# Patient Record
Sex: FEMALE | Race: OTHER | HISPANIC OR LATINO
[De-identification: names, ages, dates, MRNs, and addresses within clinical notes are randomized per-mention and may not be internally consistent; named-entity substitution may affect disease eponyms.]

---

## 2023-10-03 PROBLEM — Z00.00 ENCOUNTER FOR PREVENTIVE HEALTH EXAMINATION: Status: ACTIVE | Noted: 2023-10-03

## 2023-10-04 ENCOUNTER — APPOINTMENT (OUTPATIENT)
Dept: HEART AND VASCULAR | Facility: CLINIC | Age: 58
End: 2023-10-04
Payer: COMMERCIAL

## 2023-10-04 VITALS
DIASTOLIC BLOOD PRESSURE: 65 MMHG | TEMPERATURE: 97.2 F | HEART RATE: 80 BPM | BODY MASS INDEX: 28.7 KG/M2 | WEIGHT: 124 LBS | HEIGHT: 55 IN | SYSTOLIC BLOOD PRESSURE: 122 MMHG

## 2023-10-04 DIAGNOSIS — I05.0 RHEUMATIC MITRAL STENOSIS: ICD-10-CM

## 2023-10-04 DIAGNOSIS — T82.7XXA INFECTION AND INFLAMMATORY REACTION DUE TO OTHER CARDIAC AND VASCULAR DEVICES, IMPLANTS AND GRAFTS, INITIAL ENCOUNTER: ICD-10-CM

## 2023-10-04 PROCEDURE — 93000 ELECTROCARDIOGRAM COMPLETE: CPT

## 2023-10-04 PROCEDURE — 99205 OFFICE O/P NEW HI 60 MIN: CPT | Mod: 25

## 2023-10-04 RX ORDER — WARFARIN 4 MG/1
4 TABLET ORAL
Refills: 0 | Status: ACTIVE | COMMUNITY
Start: 2023-10-04

## 2023-10-16 ENCOUNTER — INPATIENT (INPATIENT)
Facility: HOSPITAL | Age: 58
LOS: 0 days | Discharge: HOME | DRG: 274 | End: 2023-10-17
Attending: INTERNAL MEDICINE | Admitting: INTERNAL MEDICINE
Payer: COMMERCIAL

## 2023-10-16 VITALS
SYSTOLIC BLOOD PRESSURE: 120 MMHG | RESPIRATION RATE: 17 BRPM | DIASTOLIC BLOOD PRESSURE: 70 MMHG | HEART RATE: 78 BPM | OXYGEN SATURATION: 98 %

## 2023-10-16 DIAGNOSIS — I48.92 UNSPECIFIED ATRIAL FLUTTER: ICD-10-CM

## 2023-10-16 DIAGNOSIS — Z95.2 PRESENCE OF PROSTHETIC HEART VALVE: Chronic | ICD-10-CM

## 2023-10-16 LAB
ANION GAP SERPL CALC-SCNC: 11 MMOL/L — SIGNIFICANT CHANGE UP (ref 5–17)
APTT BLD: 44.1 SEC — HIGH (ref 24.5–35.6)
BLD GP AB SCN SERPL QL: NEGATIVE — SIGNIFICANT CHANGE UP
BUN SERPL-MCNC: 13 MG/DL — SIGNIFICANT CHANGE UP (ref 7–23)
CALCIUM SERPL-MCNC: 9.6 MG/DL — SIGNIFICANT CHANGE UP (ref 8.4–10.5)
CHLORIDE SERPL-SCNC: 101 MMOL/L — SIGNIFICANT CHANGE UP (ref 96–108)
CO2 SERPL-SCNC: 28 MMOL/L — SIGNIFICANT CHANGE UP (ref 22–31)
CREAT SERPL-MCNC: 0.73 MG/DL — SIGNIFICANT CHANGE UP (ref 0.5–1.3)
EGFR: 95 ML/MIN/1.73M2 — SIGNIFICANT CHANGE UP
GLUCOSE SERPL-MCNC: 109 MG/DL — HIGH (ref 70–99)
HCT VFR BLD CALC: 43.8 % — SIGNIFICANT CHANGE UP (ref 34.5–45)
HGB BLD-MCNC: 14 G/DL — SIGNIFICANT CHANGE UP (ref 11.5–15.5)
INR BLD: 2.15 — HIGH (ref 0.85–1.18)
MCHC RBC-ENTMCNC: 28.5 PG — SIGNIFICANT CHANGE UP (ref 27–34)
MCHC RBC-ENTMCNC: 32 GM/DL — SIGNIFICANT CHANGE UP (ref 32–36)
MCV RBC AUTO: 89.2 FL — SIGNIFICANT CHANGE UP (ref 80–100)
NRBC # BLD: 0 /100 WBCS — SIGNIFICANT CHANGE UP (ref 0–0)
PLATELET # BLD AUTO: 291 K/UL — SIGNIFICANT CHANGE UP (ref 150–400)
POTASSIUM SERPL-MCNC: 4.2 MMOL/L — SIGNIFICANT CHANGE UP (ref 3.5–5.3)
POTASSIUM SERPL-SCNC: 4.2 MMOL/L — SIGNIFICANT CHANGE UP (ref 3.5–5.3)
PROTHROM AB SERPL-ACNC: 24 SEC — HIGH (ref 9.5–13)
RBC # BLD: 4.91 M/UL — SIGNIFICANT CHANGE UP (ref 3.8–5.2)
RBC # FLD: 13.3 % — SIGNIFICANT CHANGE UP (ref 10.3–14.5)
RH IG SCN BLD-IMP: POSITIVE — SIGNIFICANT CHANGE UP
SODIUM SERPL-SCNC: 140 MMOL/L — SIGNIFICANT CHANGE UP (ref 135–145)
WBC # BLD: 9.07 K/UL — SIGNIFICANT CHANGE UP (ref 3.8–10.5)
WBC # FLD AUTO: 9.07 K/UL — SIGNIFICANT CHANGE UP (ref 3.8–10.5)

## 2023-10-16 PROCEDURE — 93656 COMPRE EP EVAL ABLTJ ATR FIB: CPT

## 2023-10-16 PROCEDURE — 99223 1ST HOSP IP/OBS HIGH 75: CPT | Mod: 25

## 2023-10-16 PROCEDURE — 93657 TX L/R ATRIAL FIB ADDL: CPT

## 2023-10-16 PROCEDURE — 93655 ICAR CATH ABLTJ DSCRT ARRHYT: CPT

## 2023-10-16 RX ORDER — METOPROLOL TARTRATE 50 MG
12.5 TABLET ORAL
Refills: 0 | Status: DISCONTINUED | OUTPATIENT
Start: 2023-10-16 | End: 2023-10-17

## 2023-10-16 RX ORDER — WARFARIN SODIUM 2.5 MG/1
5 TABLET ORAL ONCE
Refills: 0 | Status: COMPLETED | OUTPATIENT
Start: 2023-10-16 | End: 2023-10-16

## 2023-10-16 RX ORDER — ATORVASTATIN CALCIUM 80 MG/1
40 TABLET, FILM COATED ORAL AT BEDTIME
Refills: 0 | Status: DISCONTINUED | OUTPATIENT
Start: 2023-10-16 | End: 2023-10-17

## 2023-10-16 RX ORDER — ACETAMINOPHEN 500 MG
650 TABLET ORAL EVERY 6 HOURS
Refills: 0 | Status: DISCONTINUED | OUTPATIENT
Start: 2023-10-16 | End: 2023-10-17

## 2023-10-16 RX ORDER — FUROSEMIDE 40 MG
20 TABLET ORAL ONCE
Refills: 0 | Status: DISCONTINUED | OUTPATIENT
Start: 2023-10-16 | End: 2023-10-17

## 2023-10-16 RX ADMIN — Medication 12.5 MILLIGRAM(S): at 22:03

## 2023-10-16 RX ADMIN — WARFARIN SODIUM 5 MILLIGRAM(S): 2.5 TABLET ORAL at 22:03

## 2023-10-16 RX ADMIN — ATORVASTATIN CALCIUM 40 MILLIGRAM(S): 80 TABLET, FILM COATED ORAL at 22:03

## 2023-10-16 NOTE — PATIENT PROFILE ADULT - PATIENT'S PREFERRED PRONOUN
Please return with fevers, increased work of breathing, or signs of dehydration.    For cardiac concerns, please contact the team at: [phone]   Her/She

## 2023-10-16 NOTE — PATIENT PROFILE ADULT - FALL HARM RISK - HARM RISK INTERVENTIONS

## 2023-10-16 NOTE — CHART NOTE - NSCHARTNOTEFT_GEN_A_CORE
EPS BRIEF OP NOTE    LISBET NEAL  [id number]    PROCEDURE:  - Atrial flutter ablation  - Atrial fibrillation ablation    INDICATION:  - Paroxysmal AF  - Atrial flutter    ELECTROPHYSIOLOGIST(S):  - Dr. Archuleta (Attending)  - Dr. Armijo (Fellow)    ANESTHESIOLOGY:  - General  - Local    FINDINGS:  - Patient arrived to the lab in flutter  - US guided access of the bilateral femoral veins. 8Fr in RFV; 9 + 7Fr in LFV  - RA Activation map with Octaray demonstrated typical atrial flutter  - Successful termination of TAFL to sinus rhythm with ablation in CTI   - Transseptal access obtained via ACQCross and SL1  - Voltage map of LA demonstrated intact conduction in the area of the left og & ridge, atrial floor, and lateral mitral isthmus  - Successful isolation of the left pulmonary veins via ablation at the Coumadin ridge and around the inferior aspect of the LIPV. Successful block across the lateral mitral isthmus with ablation at the isthmus. Successful isolation of the LA floor with ablation at the LA floor.  - No effusion at the end of the procedure  - Protamine 50mg given to reverse anticoagulation  - Vascade MVP used for hemostasis at all access sites    COMPLICATIONS:  - None    RECOMMENDATIONS:  - 20mg IV lasix once awake  - 3 hours bedrest  - Continue uninterrupted anticoagulation  - Continue home meds  - Dispo: Admit for overnight monitoring    Please refer to the full report to follow in [facility] for a detailed description of this case.    --  Paco Armijo MD  Electrophysiology PGY8

## 2023-10-16 NOTE — H&P ADULT - HISTORY OF PRESENT ILLNESS
Kelsi Hsieh is a 58 year old woman with a history of paroxysmal atrial fibrillation (Coumadin) s/p MAZE/DIANA closure (3/2018 [facility]), rheumatic heart disease w/mitral stenosis s/p bioprosthetic MVR (3/2018 [facility]), PPM (4/2018 [facility]) c/b infection and subsequent extraction (8/2018 [facility]), HTN, HLD who presents for elective ablation.     She was diagnosed with atrial fibrillation many years ago and has been maintained on coumadin for stroke prevention. At one point she was on amiodarone. She states that was discontinued after her surgery in 2018 where she underwent a MVR along with MAZE and DIANA clipping. She also had a pacemaker implanted for what sounds like symptomatic bradycardia, which was complicated by device infection requiring extraction four months post-implant. She did well from an arrhythmia perspective until last month when she had a sudden recurrence of arrhythmia with associated symptoms of extreme fatigue, palpitations, heart racing and muscle weakness. She was recently admitted 9/8/23 - 9/11/23 for AF w/RVR to an outside hospital. She was seen for cardiology f/u on 9/19 and had her metoprolol increased for better rate control.  ?  She denies SOB, chest pain, dizziness, syncope. No ER / hospital visits since she was seen in the office             ECG: 10/4/23: atrial flutter, RBBB    Echo: TTE 9/2023: LVEF 50%. Severe TR. Bioprosthetic MV w/o significant stenosis  MARTA 5/14/19: LVEF 50-55%, mobile echo density measuring 1.1 cm x 0.6 cm which is attached to the tip of chordae with indepedent mobility most likely a residual part of a papillary muscle post surgery. The LA and RA are very dilated. The RV is dilated. Moderate TR. Mild atherosclerosis of the descending aorta and the aortic arch is seen.    CT/MRI: CCTA 4/27/23 - normal coronaries w/right dominant circulation, CaSc = 0. Moderate enlargement of main PA  ?

## 2023-10-16 NOTE — H&P ADULT - ASSESSMENT
Kelsi Hsieh is a 58 year old woman with a history of paroxysmal atrial fibrillation (Coumadin) s/p MAZE/DIANA closure (3/2018 [place]), rheumatic heart disease w/mitral stenosis s/p bioprosthetic MVR (3/2018 [place]), PPM (4/2018 [place]) c/b infection and subsequent extraction (8/2018 [place]), HTN, HLD who presents for elective ablation.

## 2023-10-17 ENCOUNTER — TRANSCRIPTION ENCOUNTER (OUTPATIENT)
Age: 58
End: 2023-10-17

## 2023-10-17 VITALS
DIASTOLIC BLOOD PRESSURE: 60 MMHG | RESPIRATION RATE: 18 BRPM | SYSTOLIC BLOOD PRESSURE: 128 MMHG | HEART RATE: 60 BPM | OXYGEN SATURATION: 98 %

## 2023-10-17 LAB
HCV AB S/CO SERPL IA: 0.04 S/CO — SIGNIFICANT CHANGE UP
HCV AB S/CO SERPL IA: 0.04 S/CO — SIGNIFICANT CHANGE UP
HCV AB SERPL-IMP: SIGNIFICANT CHANGE UP
HCV AB SERPL-IMP: SIGNIFICANT CHANGE UP
INR BLD: 2.17 — HIGH (ref 0.85–1.18)
INR BLD: 2.17 — HIGH (ref 0.85–1.18)
ISTAT INR: 1.9 — HIGH (ref 0.88–1.16)
ISTAT INR: 1.9 — HIGH (ref 0.88–1.16)
ISTAT PT: 21.9 SEC — HIGH (ref 10–12.9)
ISTAT PT: 21.9 SEC — HIGH (ref 10–12.9)
ISTAT VENOUS BE: 2 MMOL/L — SIGNIFICANT CHANGE UP (ref -2–3)
ISTAT VENOUS BE: 2 MMOL/L — SIGNIFICANT CHANGE UP (ref -2–3)
ISTAT VENOUS GLUCOSE: 105 MG/DL — HIGH (ref 70–99)
ISTAT VENOUS GLUCOSE: 105 MG/DL — HIGH (ref 70–99)
ISTAT VENOUS HCO3: 28 MMOL/L — SIGNIFICANT CHANGE UP (ref 23–28)
ISTAT VENOUS HCO3: 28 MMOL/L — SIGNIFICANT CHANGE UP (ref 23–28)
ISTAT VENOUS HEMATOCRIT: 44 % — SIGNIFICANT CHANGE UP (ref 34.5–45)
ISTAT VENOUS HEMATOCRIT: 44 % — SIGNIFICANT CHANGE UP (ref 34.5–45)
ISTAT VENOUS HEMOGLOBIN: 15 GM/DL — SIGNIFICANT CHANGE UP (ref 11.5–15.5)
ISTAT VENOUS HEMOGLOBIN: 15 GM/DL — SIGNIFICANT CHANGE UP (ref 11.5–15.5)
ISTAT VENOUS IONIZED CALCIUM: 1.15 MMOL/L — SIGNIFICANT CHANGE UP (ref 1.12–1.3)
ISTAT VENOUS IONIZED CALCIUM: 1.15 MMOL/L — SIGNIFICANT CHANGE UP (ref 1.12–1.3)
ISTAT VENOUS PCO2: 45 MMHG — SIGNIFICANT CHANGE UP (ref 41–51)
ISTAT VENOUS PCO2: 45 MMHG — SIGNIFICANT CHANGE UP (ref 41–51)
ISTAT VENOUS PH: 7.39 — SIGNIFICANT CHANGE UP (ref 7.31–7.41)
ISTAT VENOUS PH: 7.39 — SIGNIFICANT CHANGE UP (ref 7.31–7.41)
ISTAT VENOUS PO2: <66 MMHG — LOW (ref 35–40)
ISTAT VENOUS PO2: <66 MMHG — LOW (ref 35–40)
ISTAT VENOUS POTASSIUM: 4.3 MMOL/L — SIGNIFICANT CHANGE UP (ref 3.5–5.3)
ISTAT VENOUS POTASSIUM: 4.3 MMOL/L — SIGNIFICANT CHANGE UP (ref 3.5–5.3)
ISTAT VENOUS SO2: 36 % — SIGNIFICANT CHANGE UP
ISTAT VENOUS SO2: 36 % — SIGNIFICANT CHANGE UP
ISTAT VENOUS SODIUM: 139 MMOL/L — SIGNIFICANT CHANGE UP (ref 135–145)
ISTAT VENOUS SODIUM: 139 MMOL/L — SIGNIFICANT CHANGE UP (ref 135–145)
ISTAT VENOUS TCO2: 29 MMOL/L — SIGNIFICANT CHANGE UP (ref 22–31)
ISTAT VENOUS TCO2: 29 MMOL/L — SIGNIFICANT CHANGE UP (ref 22–31)
PROTHROM AB SERPL-ACNC: 24.2 SEC — HIGH (ref 9.5–13)
PROTHROM AB SERPL-ACNC: 24.2 SEC — HIGH (ref 9.5–13)

## 2023-10-17 PROCEDURE — 82330 ASSAY OF CALCIUM: CPT

## 2023-10-17 PROCEDURE — 36415 COLL VENOUS BLD VENIPUNCTURE: CPT

## 2023-10-17 PROCEDURE — 86900 BLOOD TYPING SEROLOGIC ABO: CPT

## 2023-10-17 PROCEDURE — 86850 RBC ANTIBODY SCREEN: CPT

## 2023-10-17 PROCEDURE — 82803 BLOOD GASES ANY COMBINATION: CPT

## 2023-10-17 PROCEDURE — 85730 THROMBOPLASTIN TIME PARTIAL: CPT

## 2023-10-17 PROCEDURE — C1769: CPT

## 2023-10-17 PROCEDURE — 80048 BASIC METABOLIC PNL TOTAL CA: CPT

## 2023-10-17 PROCEDURE — 85610 PROTHROMBIN TIME: CPT

## 2023-10-17 PROCEDURE — 82947 ASSAY GLUCOSE BLOOD QUANT: CPT

## 2023-10-17 PROCEDURE — 85014 HEMATOCRIT: CPT

## 2023-10-17 PROCEDURE — C1894: CPT

## 2023-10-17 PROCEDURE — 99239 HOSP IP/OBS DSCHRG MGMT >30: CPT

## 2023-10-17 PROCEDURE — C1732: CPT

## 2023-10-17 PROCEDURE — 86901 BLOOD TYPING SEROLOGIC RH(D): CPT

## 2023-10-17 PROCEDURE — 84295 ASSAY OF SERUM SODIUM: CPT

## 2023-10-17 PROCEDURE — 84132 ASSAY OF SERUM POTASSIUM: CPT

## 2023-10-17 PROCEDURE — 85027 COMPLETE CBC AUTOMATED: CPT

## 2023-10-17 PROCEDURE — 86803 HEPATITIS C AB TEST: CPT

## 2023-10-17 PROCEDURE — C1760: CPT

## 2023-10-17 PROCEDURE — C1759: CPT

## 2023-10-17 PROCEDURE — C1893: CPT

## 2023-10-17 PROCEDURE — C1766: CPT

## 2023-10-17 RX ADMIN — Medication 12.5 MILLIGRAM(S): at 09:33

## 2023-10-17 NOTE — DISCHARGE NOTE PROVIDER - NSDCMRMEDTOKEN_GEN_ALL_CORE_FT
Lipitor 40 mg oral tablet: 1 tab(s) orally once a day (at bedtime)  metoprolol tartrate 25 mg oral tablet: 0.5 tab(s) orally 2 times a day  warfarin 4 mg oral tablet: 1 tab(s) orally once a day (at bedtime)

## 2023-10-17 NOTE — DISCHARGE NOTE PROVIDER - CARE PROVIDER_API CALL
Nilton Archuleta  Cardiac Electrophysiology  [address], [address] [place] [address]  Phone: [phone]  Fax: [phone]  Follow Up Time:

## 2023-10-17 NOTE — DISCHARGE NOTE NURSING/CASE MANAGEMENT/SOCIAL WORK - PATIENT PORTAL LINK FT
You can access the FollowMyHealth Patient Portal offered by [facility] by registering at the following website: [email or web address]. By joining [employer]’s FollowMyHealth portal, you will also be able to view your health information using other applications (apps) compatible with our system.

## 2023-10-17 NOTE — DISCHARGE NOTE PROVIDER - NSDCFUADDAPPT_GEN_ALL_CORE_FT
Follow up Dr. Archuleta 11/15/23 @ 9:45 am, call [phone] if you have questions.   Avoid heavy lifting, exercise and strenuous activity for 7 days  Showering is Ok.

## 2023-10-17 NOTE — DISCHARGE NOTE PROVIDER - NSDCCPGOAL_GEN_ALL_CORE_FT
To get better and follow your care plan as instructed. Rinvoq Pregnancy And Lactation Text: Based on animal studies, Rinvoq may cause embryo-fetal harm when administered to pregnant women.  The medication should not be used in pregnancy.  Breastfeeding is not recommended during treatment and for 6 days after the last dose.

## 2023-10-17 NOTE — DISCHARGE NOTE PROVIDER - NSDCCPTREATMENT_GEN_ALL_CORE_FT
PRINCIPAL PROCEDURE  Procedure: Ablation of arrhythmogenic focus for atrial flutter with anesthesia  Findings and Treatment: and atrial fibrillation

## 2023-10-17 NOTE — DISCHARGE NOTE PROVIDER - NSDCFUSCHEDAPPT_GEN_ALL_CORE_FT
Nilton Archuleta  [facility]  [facility] [address]  Scheduled Appointment: 11/15/2023

## 2023-10-17 NOTE — DISCHARGE NOTE PROVIDER - HOSPITAL COURSE
58 year old woman with a history of paroxysmal atrial fibrillation (Coumadin) s/p MAZE/DIANA closure (3/2018 [facility]), rheumatic heart disease w/mitral stenosis s/p bioprosthetic MVR (3/2018 [facility]), PPM (4/2018 [facility]) c/b infection and subsequent extraction (8/2018 [facility]), HTN, HLD who presents for elective ablation.   58 year old woman with a history of paroxysmal atrial fibrillation (Coumadin) s/p MAZE/DIANA closure (3/2018 [facility]), rheumatic heart disease w/mitral stenosis s/p bioprosthetic MVR (3/2018 [facility]), PPM (4/2018 [facility] [place] [facility]) c/b infection and subsequent extraction (8/2018 [facility] [place] [facility]), HTN, HLD who presents for elective ablation.  Patient had ablation on 10/16/23, there were no complications.  Patient is in sinus rhythm, bl groin check no bleeding, no swelling, no hematoma   Patient stable for discharge.

## 2023-10-19 DIAGNOSIS — I10 ESSENTIAL (PRIMARY) HYPERTENSION: ICD-10-CM

## 2023-10-19 DIAGNOSIS — I48.0 PAROXYSMAL ATRIAL FIBRILLATION: ICD-10-CM

## 2023-10-19 DIAGNOSIS — Z88.0 ALLERGY STATUS TO PENICILLIN: ICD-10-CM

## 2023-10-19 DIAGNOSIS — Z79.01 LONG TERM (CURRENT) USE OF ANTICOAGULANTS: ICD-10-CM

## 2023-10-19 DIAGNOSIS — Z95.3 PRESENCE OF XENOGENIC HEART VALVE: ICD-10-CM

## 2023-10-19 DIAGNOSIS — E78.5 HYPERLIPIDEMIA, UNSPECIFIED: ICD-10-CM

## 2023-10-19 DIAGNOSIS — I48.4 ATYPICAL ATRIAL FLUTTER: ICD-10-CM

## 2023-10-19 DIAGNOSIS — I45.10 UNSPECIFIED RIGHT BUNDLE-BRANCH BLOCK: ICD-10-CM

## 2023-10-19 DIAGNOSIS — Z95.0 PRESENCE OF CARDIAC PACEMAKER: ICD-10-CM

## 2023-11-14 PROBLEM — Z00.00 ENCOUNTER FOR PREVENTIVE HEALTH EXAMINATION: Status: ACTIVE | Noted: 2023-11-14

## 2023-11-15 ENCOUNTER — APPOINTMENT (OUTPATIENT)
Dept: HEART AND VASCULAR | Facility: CLINIC | Age: 58
End: 2023-11-15

## 2023-11-15 ENCOUNTER — NON-APPOINTMENT (OUTPATIENT)
Age: 58
End: 2023-11-15

## 2023-11-15 ENCOUNTER — APPOINTMENT (OUTPATIENT)
Dept: HEART AND VASCULAR | Facility: CLINIC | Age: 58
End: 2023-11-15
Payer: COMMERCIAL

## 2023-11-15 VITALS
HEIGHT: 55 IN | HEART RATE: 59 BPM | OXYGEN SATURATION: 99 % | WEIGHT: 124 LBS | DIASTOLIC BLOOD PRESSURE: 52 MMHG | SYSTOLIC BLOOD PRESSURE: 132 MMHG | BODY MASS INDEX: 28.7 KG/M2

## 2023-11-15 DIAGNOSIS — I48.92 UNSPECIFIED ATRIAL FLUTTER: ICD-10-CM

## 2023-11-15 PROCEDURE — 99214 OFFICE O/P EST MOD 30 MIN: CPT | Mod: 25

## 2023-11-15 PROCEDURE — 93000 ELECTROCARDIOGRAM COMPLETE: CPT

## 2023-11-15 RX ORDER — WARFARIN SODIUM 2.5 MG/1
1 TABLET ORAL
Refills: 0 | DISCHARGE

## 2023-11-15 RX ORDER — ATORVASTATIN CALCIUM 80 MG/1
1 TABLET, FILM COATED ORAL
Refills: 0 | DISCHARGE

## 2023-11-15 RX ORDER — METOPROLOL TARTRATE 50 MG
0.5 TABLET ORAL
Refills: 0 | DISCHARGE

## 2024-04-10 ENCOUNTER — APPOINTMENT (OUTPATIENT)
Dept: HEART AND VASCULAR | Facility: CLINIC | Age: 59
End: 2024-04-10
Payer: COMMERCIAL

## 2024-04-10 ENCOUNTER — NON-APPOINTMENT (OUTPATIENT)
Age: 59
End: 2024-04-10

## 2024-04-10 VITALS
HEART RATE: 53 BPM | OXYGEN SATURATION: 99 % | BODY MASS INDEX: 28.7 KG/M2 | WEIGHT: 124 LBS | SYSTOLIC BLOOD PRESSURE: 134 MMHG | HEIGHT: 55 IN | DIASTOLIC BLOOD PRESSURE: 70 MMHG

## 2024-04-10 DIAGNOSIS — I10 ESSENTIAL (PRIMARY) HYPERTENSION: ICD-10-CM

## 2024-04-10 DIAGNOSIS — I48.91 UNSPECIFIED ATRIAL FIBRILLATION: ICD-10-CM

## 2024-04-10 DIAGNOSIS — I49.5 SICK SINUS SYNDROME: ICD-10-CM

## 2024-04-10 DIAGNOSIS — E78.00 PURE HYPERCHOLESTEROLEMIA, UNSPECIFIED: ICD-10-CM

## 2024-04-10 PROCEDURE — 99213 OFFICE O/P EST LOW 20 MIN: CPT | Mod: 25

## 2024-04-10 PROCEDURE — 93000 ELECTROCARDIOGRAM COMPLETE: CPT

## 2024-04-10 RX ORDER — LISINOPRIL 5 MG/1
5 TABLET ORAL DAILY
Qty: 90 | Refills: 0 | Status: ACTIVE | COMMUNITY
Start: 2024-04-10

## 2024-04-10 RX ORDER — METOPROLOL TARTRATE 25 MG/1
25 TABLET, FILM COATED ORAL TWICE DAILY
Refills: 0 | Status: COMPLETED | COMMUNITY
Start: 2023-10-04 | End: 2024-04-10

## 2024-04-10 RX ORDER — ATORVASTATIN CALCIUM 20 MG/1
20 TABLET, FILM COATED ORAL
Refills: 0 | Status: ACTIVE | COMMUNITY
Start: 2024-04-10

## 2024-04-10 NOTE — PHYSICAL EXAM
[Normal Conjunctiva] : normal conjunctiva [Normal Venous Pressure] : normal venous pressure [No Carotid Bruit] : no carotid bruit [Normal S1, S2] : normal S1, S2 [No Murmur] : no murmur [No Rub] : no rub [No Gallop] : no gallop [Normal] : soft, non-tender, no masses/organomegaly, normal bowel sounds [No Edema] : no edema [Moves all extremities] : moves all extremities [No Focal Deficits] : no focal deficits [Alert and Oriented] : alert and oriented

## 2024-04-18 PROBLEM — I48.91 ATRIAL FIBRILLATION: Status: ACTIVE | Noted: 2023-10-04

## 2024-04-18 PROBLEM — I49.5 SINUS NODE DYSFUNCTION: Status: ACTIVE | Noted: 2023-11-15

## 2024-04-18 NOTE — REASON FOR VISIT
[Arrhythmia/ECG Abnorrmalities] : arrhythmia/ECG abnormalities [[employer] ] : provided by [employer]   [Interpreters_IDNumber] : [id number]

## 2024-04-18 NOTE — HISTORY OF PRESENT ILLNESS
[FreeTextEntry1] : Kelsi Hsieh is a 58 year old woman with a history of paroxysmal atrial fibrillation (Coumadin) s/p MAZE/DIANA closure (3/2018 [facility]), rheumatic heart disease w/mitral stenosis s/p bioprosthetic MVR (3/2018 [facility]), PPM (4/2018 [facility]) c/b infection and subsequent extraction (8/2018 [facility]), HTN, HLD, now s/p ablation (CTI line), PVs isolated, as well as ablation to lower posterior wall/LA floor and lateral mitral isthmus on 10/16/23 who presents today for follow-up.  She was diagnosed with atrial fibrillation many years ago and has been maintained on coumadin for stroke prevention. At one point she was on amiodarone. She states that was discontinued after her surgery in 2018 where she underwent a MVR along with MAZE and DIANA clipping. She also had a pacemaker implanted for what sounds like symptomatic bradycardia, which was complicated by device infection requiring extraction four months post-implant. She did well from an arrhythmia perspective until last month when she had a sudden recurrence of arrhythmia with associated symptoms of extreme fatigue, palpitations, heart racing and muscle weakness. She was recently admitted 9/8/23 - 9/11/23 for AF w/RVR to an outside hospital. She was seen for cardiology f/u on 9/19 and had her metoprolol increased for better rate control.  The ablation procedure was successful and uncomplicated. The AFL terminated to sinus rhythm during ablation. EKG notable for junctional rhythm. She has been asymptomatic and notes good exertional tolerance. Since her last visit she stopped metoprolol and was switched to lisinopril for hypertension. She presents today for follow-up. She feels very well, continues to deny fatigue, [place], c/p, palpitations, sob and syncope.

## 2024-04-18 NOTE — END OF VISIT
[Time Spent: ___ minutes] : I have spent [unfilled] minutes of time on the encounter. [FreeTextEntry3] : I, Jaleesa Chatterjee, am scribing for (in the presence of) Dr. Archuleta the following sections: HPI, PMH,Family/social history, ROS, Physical Exam, Assessment / Plan.   I, Nilton Archuleta, personally performed the services described in the documentation, reviewed the documentation recorded by the scribe in my presence and it accurately and completely records my words and actions.

## 2024-04-18 NOTE — REASON FOR VISIT
[Arrhythmia/ECG Abnorrmalities] : arrhythmia/ECG abnormalities [[employer] ] : provided by [employer]   [Interpreters_IDNumber] : [id number]

## 2024-04-18 NOTE — CARDIOLOGY SUMMARY
[de-identified] : 4/10/2024: Junctional rhythm 53 bpm 11/15/23: Junctional rhythm 51 bpm  10/4/23: atrial flutter, RBBB [de-identified] : CCTA 4/27/23 - normal coronaries w/right dominant circulation, CaSc = 0. Moderate enlargement of main PA [de-identified] : TTE 9/2023: LVEF 50%. Severe TR. Bioprosthetic MV w/o significant stenosis  MARTA 5/14/19: LVEF 50-55%, mobile echo density measuring 1.1 cm x 0.6 cm which is attached to the tip of chordae with indepedent mobility most likely a residual part of a papillary muscle post surgery. The LA and RA are very dilated. The RV is dilated. Moderate TR. Mild atherosclerosis of the descending aorta and the aortic arch is seen.

## 2024-04-18 NOTE — REVIEW OF SYSTEMS
[Feeling Fatigued] : feeling fatigued [Palpitations] : palpitations [Fever] : no fever [Chills] : no chills [SOB] : no shortness of breath [Dyspnea on exertion] : not dyspnea during exertion [Chest Discomfort] : no chest discomfort [Lower Ext Edema] : no extremity edema [Syncope] : no syncope [Cough] : no cough [Abdominal Pain] : no abdominal pain [Nausea] : no nausea [Vomiting] : no vomiting

## 2024-04-18 NOTE — CARDIOLOGY SUMMARY
[de-identified] : 4/10/2024: Junctional rhythm 53 bpm 11/15/23: Junctional rhythm 51 bpm  10/4/23: atrial flutter, RBBB [de-identified] : TTE 9/2023: LVEF 50%. Severe TR. Bioprosthetic MV w/o significant stenosis  MARTA 5/14/19: LVEF 50-55%, mobile echo density measuring 1.1 cm x 0.6 cm which is attached to the tip of chordae with indepedent mobility most likely a residual part of a papillary muscle post surgery. The LA and RA are very dilated. The RV is dilated. Moderate TR. Mild atherosclerosis of the descending aorta and the aortic arch is seen.   [de-identified] : CCTA 4/27/23 - normal coronaries w/right dominant circulation, CaSc = 0. Moderate enlargement of main PA

## 2024-04-18 NOTE — HISTORY OF PRESENT ILLNESS
[FreeTextEntry1] : Kelsi Hsieh is a 58 year old woman with a history of paroxysmal atrial fibrillation (Coumadin) s/p MAZE/DIANA closure (3/2018 [facility]), rheumatic heart disease w/mitral stenosis s/p bioprosthetic MVR (3/2018 [facility]), PPM (4/2018 [facility]) c/b infection and subsequent extraction (8/2018 [facility]), HTN, HLD, now s/p ablation (CTI line), PVs isolated, as well as ablation to lower posterior wall/LA floor and lateral mitral isthmus on 10/16/23 who presents today for follow-up.  She was diagnosed with atrial fibrillation many years ago and has been maintained on coumadin for stroke prevention. At one point she was on amiodarone. She states that was discontinued after her surgery in 2018 where she underwent a MVR along with MAZE and DIANA clipping. She also had a pacemaker implanted for what sounds like symptomatic bradycardia, which was complicated by device infection requiring extraction four months post-implant. She did well from an arrhythmia perspective until last month when she had a sudden recurrence of arrhythmia with associated symptoms of extreme fatigue, palpitations, heart racing and muscle weakness. She was recently admitted 9/8/23 - 9/11/23 for AF w/RVR to an outside hospital. She was seen for cardiology f/u on 9/19 and had her metoprolol increased for better rate control.  The ablation procedure was successful and uncomplicated. The AFL terminated to sinus rhythm during ablation. EKG notable for junctional rhythm. She has been asymptomatic and notes good exertional tolerance. Since her last visit she stopped metoprolol and was switched to lisinopril for hypertension. She presents today for follow-up. She feels very well, continues to deny fatigue, [place], c/p, palpitations, sob and syncope.